# Patient Record
Sex: MALE | URBAN - METROPOLITAN AREA
[De-identification: names, ages, dates, MRNs, and addresses within clinical notes are randomized per-mention and may not be internally consistent; named-entity substitution may affect disease eponyms.]

---

## 2022-03-22 ENCOUNTER — APPOINTMENT (OUTPATIENT)
Dept: URBAN - METROPOLITAN AREA SURGERY 8 | Age: 62
Setting detail: DERMATOLOGY
End: 2022-03-22

## 2022-03-22 DIAGNOSIS — D485 NEOPLASM OF UNCERTAIN BEHAVIOR OF SKIN: ICD-10-CM

## 2022-03-22 PROBLEM — D48.5 NEOPLASM OF UNCERTAIN BEHAVIOR OF SKIN: Status: ACTIVE | Noted: 2022-03-22

## 2022-03-22 PROCEDURE — OTHER COUNSELING: OTHER

## 2022-03-22 PROCEDURE — OTHER MIPS QUALITY: OTHER

## 2022-03-22 PROCEDURE — 11102 TANGNTL BX SKIN SINGLE LES: CPT

## 2022-03-22 PROCEDURE — OTHER BIOPSY BY SHAVE METHOD: OTHER

## 2022-03-22 PROCEDURE — OTHER ADDITIONAL NOTES: OTHER

## 2022-03-22 ASSESSMENT — LOCATION DETAILED DESCRIPTION DERM: LOCATION DETAILED: RIGHT CENTRAL EYEBROW

## 2022-03-22 ASSESSMENT — LOCATION SIMPLE DESCRIPTION DERM: LOCATION SIMPLE: RIGHT EYEBROW

## 2022-03-22 ASSESSMENT — LOCATION ZONE DERM: LOCATION ZONE: FACE

## 2022-03-22 NOTE — HPI: SKIN LESION
What Type Of Note Output Would You Prefer (Optional)?: Standard Output
Has Your Skin Lesion Been Treated?: not been treated
Is This A New Presentation, Or A Follow-Up?: Skin Lesion
Additional History: Pulled off and then recurred

## 2022-03-22 NOTE — PROCEDURE: BIOPSY BY SHAVE METHOD
Path Notes Override (Will Replace All Of The Above Text): self pay so please notify me or patient if any special stains needed that could affect cost. thanks!

## 2022-03-22 NOTE — PROCEDURE: ADDITIONAL NOTES
Additional Notes: patient has [employer] insurance, for which I am not in network nor is anyone in our group. discussed referral to [facility] or another provider that is in network (and list of other dermatology offices that accept [place] printed and provided) vs seeing as self-pay patient. he elected to be seen as self-pay. quoted $133 for biopsy and $99 for pathology fee= $232 total. he understood and agreed to continue to with biopsy\\n\\ndiscussed if biopsy reveals NMSC or needs further treatment, will refer to a dermatologist (likely [facility]) that is in network for further treatment Additional Notes: patient has [employer] insurance, for which I am not in network nor is anyone in our group. discussed referral to [facility] or another provider that is in network (and list of other dermatology offices that accept [employer] printed and provided) vs seeing as self-pay patient. he elected to be seen as self-pay. quoted $133 for biopsy and $99 for pathology fee= $232 total. he understood and agreed to continue to with biopsy\\n\\ndiscussed if biopsy reveals NMSC or needs further treatment, will refer to a dermatologist (likely [facility]) that is in network for further treatment